# Patient Record
Sex: FEMALE | Race: WHITE | Employment: FULL TIME | URBAN - METROPOLITAN AREA
[De-identification: names, ages, dates, MRNs, and addresses within clinical notes are randomized per-mention and may not be internally consistent; named-entity substitution may affect disease eponyms.]

---

## 2021-04-18 ENCOUNTER — APPOINTMENT (OUTPATIENT)
Dept: CT IMAGING | Facility: CLINIC | Age: 61
End: 2021-04-18
Attending: PHYSICIAN ASSISTANT
Payer: COMMERCIAL

## 2021-04-18 ENCOUNTER — HOSPITAL ENCOUNTER (EMERGENCY)
Facility: CLINIC | Age: 61
Discharge: HOME | End: 2021-04-18
Attending: PHYSICIAN ASSISTANT | Admitting: PHYSICIAN ASSISTANT
Payer: COMMERCIAL

## 2021-04-18 ENCOUNTER — APPOINTMENT (OUTPATIENT)
Dept: MRI IMAGING | Facility: CLINIC | Age: 61
End: 2021-04-18
Attending: PHYSICIAN ASSISTANT
Payer: COMMERCIAL

## 2021-04-18 VITALS
BODY MASS INDEX: 39.27 KG/M2 | OXYGEN SATURATION: 97 % | WEIGHT: 230 LBS | HEIGHT: 64 IN | HEART RATE: 56 BPM | DIASTOLIC BLOOD PRESSURE: 96 MMHG | TEMPERATURE: 97.5 F | SYSTOLIC BLOOD PRESSURE: 155 MMHG | RESPIRATION RATE: 18 BRPM

## 2021-04-18 DIAGNOSIS — H53.9 VISUAL DISTURBANCE OF ONE EYE: ICD-10-CM

## 2021-04-18 DIAGNOSIS — R51.9 HEADACHE: ICD-10-CM

## 2021-04-18 LAB
ANION GAP SERPL CALCULATED.3IONS-SCNC: 4 MMOL/L (ref 3–14)
BASOPHILS # BLD AUTO: 0 10E9/L (ref 0–0.2)
BASOPHILS NFR BLD AUTO: 0.3 %
BUN SERPL-MCNC: 12 MG/DL (ref 7–30)
CALCIUM SERPL-MCNC: 9.1 MG/DL (ref 8.5–10.1)
CHLORIDE SERPL-SCNC: 106 MMOL/L (ref 94–109)
CO2 SERPL-SCNC: 30 MMOL/L (ref 20–32)
CREAT SERPL-MCNC: 0.67 MG/DL (ref 0.52–1.04)
CRP SERPL-MCNC: <2.9 MG/L (ref 0–8)
DIFFERENTIAL METHOD BLD: NORMAL
EOSINOPHIL # BLD AUTO: 0.1 10E9/L (ref 0–0.7)
EOSINOPHIL NFR BLD AUTO: 1.6 %
ERYTHROCYTE [DISTWIDTH] IN BLOOD BY AUTOMATED COUNT: 12.3 % (ref 10–15)
ERYTHROCYTE [SEDIMENTATION RATE] IN BLOOD BY WESTERGREN METHOD: 4 MM/H (ref 0–30)
GFR SERPL CREATININE-BSD FRML MDRD: >90 ML/MIN/{1.73_M2}
GLUCOSE SERPL-MCNC: 101 MG/DL (ref 70–99)
HCT VFR BLD AUTO: 42.3 % (ref 35–47)
HGB BLD-MCNC: 13.8 G/DL (ref 11.7–15.7)
IMM GRANULOCYTES # BLD: 0 10E9/L (ref 0–0.4)
IMM GRANULOCYTES NFR BLD: 0.1 %
LYMPHOCYTES # BLD AUTO: 1.6 10E9/L (ref 0.8–5.3)
LYMPHOCYTES NFR BLD AUTO: 21.5 %
MCH RBC QN AUTO: 29.4 PG (ref 26.5–33)
MCHC RBC AUTO-ENTMCNC: 32.6 G/DL (ref 31.5–36.5)
MCV RBC AUTO: 90 FL (ref 78–100)
MONOCYTES # BLD AUTO: 0.4 10E9/L (ref 0–1.3)
MONOCYTES NFR BLD AUTO: 5.2 %
NEUTROPHILS # BLD AUTO: 5.4 10E9/L (ref 1.6–8.3)
NEUTROPHILS NFR BLD AUTO: 71.3 %
NRBC # BLD AUTO: 0 10*3/UL
NRBC BLD AUTO-RTO: 0 /100
PLATELET # BLD AUTO: 226 10E9/L (ref 150–450)
POTASSIUM SERPL-SCNC: 3.3 MMOL/L (ref 3.4–5.3)
RBC # BLD AUTO: 4.7 10E12/L (ref 3.8–5.2)
SODIUM SERPL-SCNC: 140 MMOL/L (ref 133–144)
WBC # BLD AUTO: 7.5 10E9/L (ref 4–11)

## 2021-04-18 PROCEDURE — 96375 TX/PRO/DX INJ NEW DRUG ADDON: CPT

## 2021-04-18 PROCEDURE — 70549 MR ANGIOGRAPH NECK W/O&W/DYE: CPT

## 2021-04-18 PROCEDURE — A9585 GADOBUTROL INJECTION: HCPCS | Performed by: PHYSICIAN ASSISTANT

## 2021-04-18 PROCEDURE — 250N000009 HC RX 250: Performed by: PHYSICIAN ASSISTANT

## 2021-04-18 PROCEDURE — 86140 C-REACTIVE PROTEIN: CPT | Performed by: PHYSICIAN ASSISTANT

## 2021-04-18 PROCEDURE — 70544 MR ANGIOGRAPHY HEAD W/O DYE: CPT

## 2021-04-18 PROCEDURE — 70498 CT ANGIOGRAPHY NECK: CPT

## 2021-04-18 PROCEDURE — 70553 MRI BRAIN STEM W/O & W/DYE: CPT

## 2021-04-18 PROCEDURE — 250N000013 HC RX MED GY IP 250 OP 250 PS 637: Performed by: PHYSICIAN ASSISTANT

## 2021-04-18 PROCEDURE — 250N000011 HC RX IP 250 OP 636: Performed by: PHYSICIAN ASSISTANT

## 2021-04-18 PROCEDURE — 85652 RBC SED RATE AUTOMATED: CPT | Performed by: PHYSICIAN ASSISTANT

## 2021-04-18 PROCEDURE — 99285 EMERGENCY DEPT VISIT HI MDM: CPT | Mod: 25

## 2021-04-18 PROCEDURE — 80048 BASIC METABOLIC PNL TOTAL CA: CPT | Performed by: PHYSICIAN ASSISTANT

## 2021-04-18 PROCEDURE — 85025 COMPLETE CBC W/AUTO DIFF WBC: CPT | Performed by: PHYSICIAN ASSISTANT

## 2021-04-18 PROCEDURE — 258N000003 HC RX IP 258 OP 636: Performed by: PHYSICIAN ASSISTANT

## 2021-04-18 PROCEDURE — 96374 THER/PROPH/DIAG INJ IV PUSH: CPT | Mod: 59

## 2021-04-18 PROCEDURE — 76512 OPH US DX B-SCAN: CPT

## 2021-04-18 PROCEDURE — 255N000002 HC RX 255 OP 636: Performed by: PHYSICIAN ASSISTANT

## 2021-04-18 PROCEDURE — 96361 HYDRATE IV INFUSION ADD-ON: CPT

## 2021-04-18 RX ORDER — METOCLOPRAMIDE HYDROCHLORIDE 5 MG/ML
5 INJECTION INTRAMUSCULAR; INTRAVENOUS ONCE
Status: COMPLETED | OUTPATIENT
Start: 2021-04-18 | End: 2021-04-18

## 2021-04-18 RX ORDER — TETRACAINE HYDROCHLORIDE 5 MG/ML
SOLUTION OPHTHALMIC
Status: DISCONTINUED
Start: 2021-04-18 | End: 2021-04-18 | Stop reason: HOSPADM

## 2021-04-18 RX ORDER — IOPAMIDOL 755 MG/ML
500 INJECTION, SOLUTION INTRAVASCULAR ONCE
Status: COMPLETED | OUTPATIENT
Start: 2021-04-18 | End: 2021-04-18

## 2021-04-18 RX ORDER — KETOROLAC TROMETHAMINE 15 MG/ML
15 INJECTION, SOLUTION INTRAMUSCULAR; INTRAVENOUS ONCE
Status: COMPLETED | OUTPATIENT
Start: 2021-04-18 | End: 2021-04-18

## 2021-04-18 RX ORDER — ACETAMINOPHEN 500 MG
1000 TABLET ORAL ONCE
Status: COMPLETED | OUTPATIENT
Start: 2021-04-18 | End: 2021-04-18

## 2021-04-18 RX ORDER — DIPHENHYDRAMINE HYDROCHLORIDE 50 MG/ML
25 INJECTION INTRAMUSCULAR; INTRAVENOUS ONCE
Status: COMPLETED | OUTPATIENT
Start: 2021-04-18 | End: 2021-04-18

## 2021-04-18 RX ORDER — GADOBUTROL 604.72 MG/ML
10 INJECTION INTRAVENOUS ONCE
Status: COMPLETED | OUTPATIENT
Start: 2021-04-18 | End: 2021-04-18

## 2021-04-18 RX ADMIN — SODIUM CHLORIDE 1000 ML: 9 INJECTION, SOLUTION INTRAVENOUS at 14:38

## 2021-04-18 RX ADMIN — SODIUM CHLORIDE 80 ML: 9 INJECTION, SOLUTION INTRAVENOUS at 18:28

## 2021-04-18 RX ADMIN — IOPAMIDOL 70 ML: 755 INJECTION, SOLUTION INTRAVENOUS at 18:28

## 2021-04-18 RX ADMIN — DIPHENHYDRAMINE HYDROCHLORIDE 25 MG: 50 INJECTION INTRAMUSCULAR; INTRAVENOUS at 17:39

## 2021-04-18 RX ADMIN — ACETAMINOPHEN 1000 MG: 500 TABLET, FILM COATED ORAL at 14:37

## 2021-04-18 RX ADMIN — KETOROLAC TROMETHAMINE 15 MG: 15 INJECTION, SOLUTION INTRAMUSCULAR; INTRAVENOUS at 17:40

## 2021-04-18 RX ADMIN — GADOBUTROL 10 ML: 604.72 INJECTION INTRAVENOUS at 16:55

## 2021-04-18 RX ADMIN — PROCHLORPERAZINE EDISYLATE 5 MG: 5 INJECTION INTRAMUSCULAR; INTRAVENOUS at 17:39

## 2021-04-18 RX ADMIN — METOCLOPRAMIDE HYDROCHLORIDE 5 MG: 5 INJECTION INTRAMUSCULAR; INTRAVENOUS at 14:37

## 2021-04-18 ASSESSMENT — ENCOUNTER SYMPTOMS
VOMITING: 0
NUMBNESS: 0
HEADACHES: 1
FEVER: 0
EYE PAIN: 0
NAUSEA: 0
SHORTNESS OF BREATH: 0
COUGH: 0
SPEECH DIFFICULTY: 0
WEAKNESS: 0
NERVOUS/ANXIOUS: 1

## 2021-04-18 ASSESSMENT — MIFFLIN-ST. JEOR: SCORE: 1603.27

## 2021-04-18 ASSESSMENT — VISUAL ACUITY
OS: 20/30;WITHOUT CORRECTIVE LENSES
OD: 20/25;WITHOUT CORRECTIVE LENSES

## 2021-04-18 NOTE — ED TRIAGE NOTES
Pt presents for complaint of a headache that started yesterday night. States it feels like a lump in the anterior top of her head. States she attempt her home medications which have not helped. Pt also notes that she started having floaters in her eyes a few days ago. States they were black spider web like things which she states has now degraded into a gray smaller shaped like a crescent. ABC intact, A&Ox4.

## 2021-04-18 NOTE — ED PROVIDER NOTES
"  History   Chief Complaint:  Eye Problem & Headache    HPI  Tamra Nicolas is a 60 year old female with a history of hypertension and hyperlipidemia who presents for evaluation of vision changes and a headache. A few days ago, she reports developing intermittent vision changes. She describes these changes as \"black spider leg\" objects that would come out of the corner of her right eye. As these were intermittent, she attributed them to \"floaters\" and ignored them initially as she was dealing with significant family stress. Last night, unfortunately, she reports developing a \"blinding\" headache on the top right of her head; she states this feels like someone hit her in one spot with a hammer. When the headache started, she reports the \"floaters\" were still present in her right eye, though they were less prominent, smaller, now gray and semicircular objects. She reports a history of tension type headaches which always resolve with Tylenol, so when this one started that is what she did; she reports the Tylenol did not provide significant relief. She then took a sleep aid medication which helped her fall asleep, but the headache was still present this morning. As she has no history of significant migraine headache, so she presented to the ED when this one persisted.     Here, she reports the headache is still present, but she notes it improves when she lays on her left side or sits upright. She denies recent head or eye injury, nor has she been sick recently with a fever or cold symptoms. She also denies associated tinnitus, unilateral numbness, tingling, weakness, slurred speech, or other neurologic symptoms. She states the eye images have always been on the lateral edge of her eye and she reports they move when she moves her eyes. She also denies associated nausea/vomiting. She does report wearing glasses at baseline, but is not a contact lens user. She also notes a significant increase in stress related to " "family issues recently. She denies personal history of blood clots.     Allergies:  Erythromycin     Medications:    Atorvastatin   Lisinopril   Voltaren     Past Medical History:    Hypertension   Disordered eating   Osteoarthritis  Obesity   Hyperlipidemia  Metabolic syndrome   Enlarged thyroid   History of scoliosis   Acid reflux   Situational depression     Past Surgical History:    Bilateral inguinal hernia repair   Insert cervical dilator   Left knee arthroscopy x3  Left knee replacement     Family History:    Denies family history.     Social History:  Presents unaccompanied.   She is from [facility]; in [place] with family.   Denies tobacco use.     Review of Systems   Constitutional: Negative for fever.   HENT: Negative for congestion and tinnitus.    Eyes: Positive for visual disturbance. Negative for pain.   Respiratory: Negative for cough and shortness of breath.    Gastrointestinal: Negative for nausea and vomiting.   Neurological: Positive for headaches. Negative for speech difficulty, weakness and numbness.   Psychiatric/Behavioral: The patient is nervous/anxious.    All other systems reviewed and are negative.    Physical Exam     Patient Vitals for the past 24 hrs:   BP Temp Pulse Resp SpO2 Height Weight   04/18/21 1750 (!) 155/96 -- 56 -- 97 % -- --   04/18/21 1745 (!) 155/96 -- -- -- -- -- --   04/18/21 1740 (!) 156/82 -- 66 -- 96 % -- --   04/18/21 1730 -- -- -- -- 95 % -- --   04/18/21 1430 (!) 153/84 -- -- -- 98 % -- --   04/18/21 1400 (!) 144/70 -- -- -- -- -- --   04/18/21 1345 (!) 162/85 -- 79 18 97 % -- --   04/18/21 1329 (!) 170/100 97.5  F (36.4  C) 83 16 96 % 1.626 m (5' 4\") 104.3 kg (230 lb)        Physical Exam  General: Awake, alert, pleasant, non-toxic.  Head:  Scalp is NC/AT  Eyes:  Conjunctiva normal, PERRL  No FB or abnormality seen on fluoroscein stain.  IOP 11mmHg right eye.   ENT:  The external nose and ears are normal.   Neck:  Normal range of motion without " rigidity.  CV:  Regular rate and rhythm    No pathologic murmur, rubs, or gallops.  Resp:  Breath sounds are clear bilaterally.  No crackles, wheezes, rhonchi, stridor.    Non-labored, no retractions or accessory muscle use  Abdomen: Abdomen is soft, no distension, no tenderness, no masses. No CVA tenderness.  MS:  No lower extremity edema or asymmetric calf swelling.   Skin:  Warm and dry, No rash or lesions noted. 2+ peripheral pulses in all extremities  Neuro:  Alert and oriented x3. 5/5 strength BL in UE and LE, normal sensation to touch.  Cranial nerves 2-12 intact.  Normal finger to nose testing.  Gait normal  Psych: Awake. Alert. Normal affect. Appropriate interactions.    Emergency Department Course     Imaging:  CTA Head Neck with Contrast   Final Result   IMPRESSION:       HEAD CTA:    1.  No evidence of large vessel occlusion or high-grade stenosis.   2.  No evidence of intraluminal filling defect.   3.  Asymmetrically small left internal carotid artery with mild stenosis at the cavernous segment.   4.  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      NECK CTA:   1.  No evidence of large vessel occlusion or high-grade stenosis.   2.  Mild plaquing at the left carotid bifurcation.   3.  Asymmetrically small left internal carotid artery.   4.  Diminutive right vertebral artery.      MR Brain w/o & w Contrast   Final Result   IMPRESSION:   1.  No evidence of acute ischemia or hemorrhage.   2.  Few white matter T2 hyperintensities likely represent chronic small vessel ischemic change.            MRA Angiogram Neck w/o & w Contrast   Final Result   IMPRESSION:   1.  No evidence of large vessel occlusion, dissection, or high-grade stenosis.      MR Head w/o Contrast Angiogram   Final Result   IMPRESSION:   1.  Small area of intraluminal signal loss involving the left internal carotid artery at the  lacerum/proximal cavernous segment. This presumably represents flow artifact, however, small filling defect cannot be completely excluded. CTA could be    performed.   2.  Asymmetrically small left internal carotid artery with mild stenosis at the cavernous segment.      POC US SOFT TISSUE   Final Result        POC US SOFT TISSUE (US for Ocular Complaints):  Normal US of the Right Eye.  The lens location and retina appeared normal.  No posterior chamber hemorrhage was visualized.  No foreign bodies seen. Performed and interpreted by Dr. Kit Dennis.    Laboratory:  CBC: WBC: 7.5, HGB: 13.8, PLT: 226  BMP: Glucose 101 (H), K: 3.3 (L), o/w WNL (Creatinine: 0.67)    ESR: 4   CRP: <2.9    Emergency Department Course:    Reviewed:  I reviewed the patient's nursing notes, vitals, past medical records, and Care Everywhere.     Assessments:  1407: I performed an exam of the patient, as documented above. History obtained and plan for ED work up discussed as well.   1518: I reassessed the patient with Dr. Dennis and discussed the results of the work up thus far. Dr. Dennis performed an ocular ultrasound at bedside, see his chart for details.   1728: Visual acuity was checked in the ED and noted to be Right Eye: 20/25 and Left Eye: 20/30 (both are without her corrective lenses which she usually wears).   1750: I reassessed the patient and discussed the MR results and the radiologist's recommendation for CTA study. I also obtained intraocular pressures on her, as documented above.     Consults:   1515: I spoke with Dr. Dennis regarding the patient's case; he agreed to share service with me. Please see his note for details.   1740: Dr. Zarate, neuroradiology, called Dr. Dennis regarding the MRA results; he recommends obtaining a CTA scan at this time.     Interventions:  1437: Reglan, 5 mg, IV   1437: Tylenol, 1000 mg, PO  1438: NS 1L IV bolus   1739: Benadryl, 25 mg, IV injection   1739: Compazine, 5 mg, IV injection   1740:  Toradol, 15 mg, IV injection    Disposition:  Care of the patient was transferred to my colleague Dr. Dennis pending CTA results and final disposition.     Impression & Plan      Medical Decision Making:   Tamra Nicolas is a 60 year old female who presents with headache visual disturbance of right eye.  MRI/MRA negative for stroke, bleed, or other abnormality but shows questionable vascular abnormality noted, though CTA reasurring likely due to tortuous anatomy of vessel.  POC US without signs retinal detachment, lens dislocation, vitreous hemorrhage.  No evidence of glaucoma or temp arteritis, keratitis, dendritic lesion, FB, orbital celluitis, endopthalmitis, etc. Likely atypical migraine.  Close follow-up with ophthalmology in next 1-2 days if symptoms persistent to rule out other etiologies.  Return precautions given     Diagnosis:     ICD-10-CM    1. Headache  R51.9    2. Visual disturbance of one eye  H53.9        Discharge Medications:  There are no discharge medications for this patient.     Scribe Disclosure:  I, Sunita Jordan, am serving as a scribe on 4/18/2021 at 2:07 PM to personally document services performed by Thien Colunga PA-C based on my observations and the provider's statements to me.      4/18/2021   EMERGENCY DEPARTMENT     Thien Colunga PA-C  04/18/21 7879

## 2021-04-18 NOTE — ED PROVIDER NOTES
"Emergency Department Attending Supervision Note  4/18/2021  3:11 PM    I evaluated this patient in conjunction with Thien Colunga    Briefly, the patient presented with vision changes and a headache. A few days ago, she reports developing intermittent \"black spider leg\" objects that would come out of the corner of her right eye.  Last night she also developed a \"blinding\" headache on the top right of her head.     On my exam,   Vital signs:  Temp: 97.5  F (36.4  C)   BP: (!) 155/96 Pulse: 56   Resp: 18 SpO2: 97 % O2 Device: None (Room air)   Height: 162.6 cm (5' 4\") Weight: 104.3 kg (230 lb)  Estimated body mass index is 39.48 kg/m  as calculated from the following:    Height as of this encounter: 1.626 m (5' 4\").    Weight as of this encounter: 104.3 kg (230 lb).    General: Alert, appears well-developed and well-nourished. Cooperative.     In mild distress  HEENT:  Head:  Atraumatic  Ears:  External ears are normal  Mouth/Throat:  Oropharynx is without erythema or exudate and mucous membranes are moist.  Eyes:   Conjunctivae normal and EOM are normal. No scleral icterus.    Pupils are equal, round, and reactive to light.   Neck:   Normal range of motion. Neck supple.  CV:  Normal rate, regular rhythm, normal heart sounds and radial pulses are 2+ and symmetric.  No murmur.  Resp:  Breath sounds are clear bilaterally    Non-labored, no retractions or accessory muscle use  GI:  Abdomen is soft, no distension, no tenderness. No rebound or guarding.  No CVA tenderness bilaterally  MS:  Normal range of motion. No edema.    Normal strength in all 4 extremities.     Back atraumatic.    No midline cervical, thoracic, or lumbar tenderness  Skin:  Warm and dry.  No rash or lesions noted.  Neuro: Alert. Normal strength. GCS: 15  Psych:  Normal mood and affect.    Results:  CTA Head Neck with Contrast   Final Result   IMPRESSION:       HEAD CTA:    1.  No evidence of large vessel occlusion or high-grade stenosis.   2. "  No evidence of intraluminal filling defect.   3.  Asymmetrically small left internal carotid artery with mild stenosis at the cavernous segment.   4.  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      NECK CTA:   1.  No evidence of large vessel occlusion or high-grade stenosis.   2.  Mild plaquing at the left carotid bifurcation.   3.  Asymmetrically small left internal carotid artery.   4.  Diminutive right vertebral artery.      MR Brain w/o & w Contrast   Final Result   IMPRESSION:   1.  No evidence of acute ischemia or hemorrhage.   2.  Few white matter T2 hyperintensities likely represent chronic small vessel ischemic change.            MRA Angiogram Neck w/o & w Contrast   Final Result   IMPRESSION:   1.  No evidence of large vessel occlusion, dissection, or high-grade stenosis.      MR Head w/o Contrast Angiogram   Final Result   IMPRESSION:   1.  Small area of intraluminal signal loss involving the left internal carotid artery at the lacerum/proximal cavernous segment. This presumably represents flow artifact, however, small filling defect cannot be completely excluded. CTA could be    performed.   2.  Asymmetrically small left internal carotid artery with mild stenosis at the cavernous segment.      POC US SOFT TISSUE   Final Result        Labs Ordered and Resulted from Time of ED Arrival Up to the Time of Departure from the ED   BASIC METABOLIC PANEL - Abnormal; Notable for the following components:       Result Value    Potassium 3.3 (*)     Glucose 101 (*)     All other components within normal limits   CBC WITH PLATELETS DIFFERENTIAL   CRP INFLAMMATION   ERYTHROCYTE SEDIMENTATION RATE AUTO     ED course:  1502 I spoke with Thien Colunga PA-C, regarding patient.  1511 I evaluated the patient in ED room 22.  1533 I performed a bedside ultrasound on the patient, results above.  1736 I spoke with   Elliot of the Neuro-Radiology service regarding patient's presentation and plan of care.    MDM:  Patient is a 60-year-old female who presents with right-sided headache and right visual field floaters.  Broad work-up pursued in the emergency department.  MRI of the brain reassuringly shows no acute intracranial process.  MRA concerning for potential signal loss in the left internal carotid artery and recommended CTA imaging.  Reassuringly CTA imaging of the head neck was unremarkable, likely just concerning for tortuous anatomy to the internal carotid artery.  No evidence of stenosis nor thrombosis on CTA imaging.  Bedside ultrasound shows no evidence of vitreous hemorrhage and no evidence of retinal detachment.  I do have suspicion for potential atypical migraine as the cause the patient's presentation this evening.  She is encouraged to follow-up closely with ophthalmology in follow-up particularly if patient symptoms persist.  Reassuringly patient feels fully improved on final recheck.  After all questions answered & return precautions understood, discharged home.    Diagnosis    ICD-10-CM    1. Headache  R51.9    2. Visual disturbance of one eye  H53.9      Kit Dennis MD White, Scott, MD  04/18/21 [id number]

## 2021-04-18 NOTE — DISCHARGE INSTRUCTIONS
You should call the eye doctor tomorrow to schedule an appointment as soon as possible for further evaluation regarding the visual disturbance in your right eye.  You should return to the ED immediately if worsening or changing vision, or loss of vision in 1 portion of the eye.    Discharge Instructions  Headache    You were seen today for a headache. Headaches may be caused by many different things such as muscle tension, sinus inflammation, anxiety and stress, having too little sleep, too much alcohol, some medical conditions or injury. You may have a migraine, which is caused by changes in the blood vessels in your head.  At this time your provider does not find that your headache is a sign of anything dangerous or life-threatening.  However, sometimes the signs of serious illness do not show up right away.      Generally, every Emergency Department visit should have a follow-up clinic visit with either a primary or a specialty clinic/provider. Please follow-up as instructed by your emergency provider today.    Return to the Emergency Department if:  You get a new fever of 100.4 F or higher.  Your headache gets much worse.  You get a stiff neck with your headache.  You get a new headache that is significantly different or worse than headaches you have had before.  You are vomiting (throwing up) and cannot keep food or water down.  You have blurry or double vision or other problems with your eyes.  You have a new weakness on one side of your body.  You have difficulty with balance which is new.  You or your family thinks you are confused.  You have a seizure.    What can I do to help myself?  Pain medications - You may take a pain medication such as Tylenol  (acetaminophen), Advil , Motrin  (ibuprofen) or Aleve  (naproxen).  Take a pain reliever as soon as you notice symptoms.  Starting medications as soon as you start to have symptoms may lessen the amount of pain you have.  Relaxing in a quiet, dark room may  help.  Get enough sleep and eat meals regularly.  You may need to watch for certain foods or other things which may trigger your headaches.  Keeping a journal of your headaches and possible triggers may help you and your primary provider to identify things which you should avoid which may be causing your headaches.  If you were given a prescription for medicine here today, be sure to read all of the information (including the package insert) that comes with your prescription.  This will include important information about the medicine, its side effects, and any warnings that you need to know about.  The pharmacist who fills the prescription can provide more information and answer questions you may have about the medicine.  If you have questions or concerns that the pharmacist cannot address, please call or return to the Emergency Department.   Remember that you can always come back to the Emergency Department if you are not able to see your regular provider in the amount of time listed above, if you get any new symptoms, or if there is anything that worries you.

## 2021-12-12 ENCOUNTER — HEALTH MAINTENANCE LETTER (OUTPATIENT)
Age: 61
End: 2021-12-12

## 2022-10-03 ENCOUNTER — HEALTH MAINTENANCE LETTER (OUTPATIENT)
Age: 62
End: 2022-10-03

## 2023-02-11 ENCOUNTER — HEALTH MAINTENANCE LETTER (OUTPATIENT)
Age: 63
End: 2023-02-11

## 2023-05-20 ENCOUNTER — HEALTH MAINTENANCE LETTER (OUTPATIENT)
Age: 63
End: 2023-05-20

## 2024-03-09 ENCOUNTER — HEALTH MAINTENANCE LETTER (OUTPATIENT)
Age: 64
End: 2024-03-09